# Patient Record
Sex: MALE | Race: WHITE
[De-identification: names, ages, dates, MRNs, and addresses within clinical notes are randomized per-mention and may not be internally consistent; named-entity substitution may affect disease eponyms.]

---

## 2021-04-03 ENCOUNTER — HOSPITAL ENCOUNTER (EMERGENCY)
Dept: HOSPITAL 76 - ED | Age: 31
Discharge: HOME | End: 2021-04-03
Payer: MEDICAID

## 2021-04-03 VITALS — DIASTOLIC BLOOD PRESSURE: 66 MMHG | SYSTOLIC BLOOD PRESSURE: 125 MMHG

## 2021-04-03 DIAGNOSIS — R60.0: Primary | ICD-10-CM

## 2021-04-03 PROCEDURE — 99284 EMERGENCY DEPT VISIT MOD MDM: CPT

## 2021-04-03 NOTE — ULTRASOUND REPORT
PROCEDURE:  Duplex Ext Veins Left

 

INDICATIONS:  LLE swelling

 

TECHNIQUE:  

Real-time imaging, as well as color and pulse Doppler interrogation, were performed of the lower extr
emity deep veins from the inguinal ligament to the popliteal fossa.  

 

COMPARISON:  None.

 

FINDINGS:  The deep veins are normally compressible, and free of intraluminal thrombus.  Color and pu
lse Doppler demonstrate normal phasic intraluminal flow.  There is normal augmentation response to di
stal compression maneuver.  

 

Additionally, near the area of reported pain in the anterior knee, near the underlying osseous struct
ures, there is a 5 mm x 3 mm x 3 mm anechoic structure which likely represents a ganglion cyst or jaswant
nt recess.

 

IMPRESSION:  

No evidence of deep venous thrombosis in the left lower cavity.

 

Additionally, near the area of reported pain in the anterior knee, near the underlying osseous struct
ures, there is a 5 mm x 3 mm x 3 mm anechoic structure which likely represents a ganglion cyst or jaswant
nt recess.

 

Reviewed by: Lyle Prieto on 4/3/2021 4:52 PM MICHELE

Approved by: Lyle Prieto on 4/3/2021 4:52 PM MICHELE

 

 

Station ID:  [id number]

## 2021-04-03 NOTE — ED PHYSICIAN DOCUMENTATION
History of Present Illness





- Stated complaint


Stated Complaint: LT LEG SWOLLEN





- Chief complaint


Chief Complaint: Ext Problem





- History obtained from


History obtained from: Patient





- History of Present Illness


Timing: How many days ago (3)


Pain level max: 0


Pain level now: 0





- Additonal information


Additional information: 





31-year-old male presents to the emergency department with intermittent swelling

to the left calf over the past 3 days.  Seems to be worse when he is kneeling, 

better with elevating his legs or walking.  No redness.  No fevers.  No pain





Review of Systems


Constitutional: denies: Fever, Chills


Cardiac: denies: Chest pain / pressure


Respiratory: denies: Dyspnea, Wheezing





PD PAST MEDICAL HISTORY





- Past Medical History


Past Medical History: No





- Past Surgical History


Past Surgical History: No





- Present Medications


Home Medications: 


                                Ambulatory Orders











 Medication  Instructions  Recorded  Confirmed


 


No Known Home Medications  10/21/15 10/21/15














- Allergies


Allergies/Adverse Reactions: 


                                    Allergies











Allergy/AdvReac Type Severity Reaction Status Date / Time


 


No Known Drug Allergies Allergy   Verified 04/03/21 16:23














- Social History


Does the pt smoke?: No


Smoking Status: Never smoker


Does the pt drink ETOH?: Yes


Does the pt have substance abuse?: No





- Immunizations


Immunizations are current?: Yes





PD ED PE NORMAL





- Vitals


Vital signs reviewed: Yes





- General


General: Alert and oriented X 3, No acute distress





- HEENT


HEENT: Moist mucous membranes





- Neck


Neck: Supple, no meningeal sign





- Cardiac


Cardiac: RRR





- Respiratory


Respiratory: No respiratory distress, Clear bilaterally





- Derm


Derm: Warm and dry





- Extremities


Extremities: No calf tenderness / cord, Other (Normal examination of the knee 

including all ligaments.  No joint effusion.  Minimal edema to the left anterior

tibia and calf.  Neurovascular intact.  No skin changes.  No calf tenderness.)





- Neuro


Neuro: Alert and oriented X 3





Results





- Vitals


Vitals: 


                               Vital Signs - 24 hr











  04/03/21





  16:17


 


Temperature 36.4 C L


 


Heart Rate 66


 


Respiratory 16





Rate 


 


Blood Pressure 125/66


 


O2 Saturation 99








                                     Oxygen











O2 Source                      Room air

















- Rads (name of study)


  ** Duplex ultrasound left lower extremity


Radiology: Prelim report reviewed, EMP read contemporaneously, See rad report 

(No acute findings no DVT)





PD MEDICAL DECISION MAKING





- ED course


Complexity details: considered differential, d/w patient


ED course: 





31-year-old male with mild swelling to the left calf.  Negative ultrasound.  No 

evidence of bony abnormality.  No evidence of knee injury or instability.  No 

joint effusion.  We will have him follow-up with his doctor for further care.  

No emergency medical condition at this time.  Patient counseled regarding signs 

and symptoms for which I believe and urgent re-evaluation would be necessary. 

Patient with good understanding of and agreement to plan and is comfortable 

going home at this time





This document was made in part using voice recognition software. While efforts 

are made to proofread this document, sound alike and grammatical errors may 

occur.





Departure





- Departure


Disposition: 01 Home, Self Care


Clinical Impression: 


 Peripheral edema





Condition: Good


Instructions:  ED Leg Swelling Unilateral


Follow-Up: 


Your,doctor in 1 week [Other]


Comments: 


Follow-up with your doctor this week for further care.  Return if you worsen.  

Your ultrasound does not show any abnormalities today.  Return especially for 

redness, swelling that does not go away or any other new or worsening symptoms.


Discharge Date/Time: 04/03/21 17:47

## 2021-04-07 NOTE — EXTERNAL MEDICAL SUMMARY RPT
Continuity of Care Document

                            Created on:2021



Patient:SHELDON GRAY

Sex:Male

:1990

External Reference #:[id number]





Demographics







                          Phone                     Unavailable

 

                          Preferred Language        Unknown

 

                          Marital Status            Unknown

 

                          [employer] Affiliation     Unknown

 

                          Race                      Unknown

 

                          Ethnic Group              Unknown









Author







                          Organization              [facility]

 

                          [place] [address]

 

                          Phone                     [phone]









Social History







                     date                description         facility

 

                     [id number]

## 2021-04-20 ENCOUNTER — HOSPITAL ENCOUNTER (OUTPATIENT)
Dept: HOSPITAL 76 - COV | Age: 31
Discharge: HOME | End: 2021-04-20
Attending: FAMILY MEDICINE
Payer: COMMERCIAL

## 2021-04-20 DIAGNOSIS — U07.1: Primary | ICD-10-CM

## 2021-06-16 ENCOUNTER — HOSPITAL ENCOUNTER (EMERGENCY)
Dept: HOSPITAL 76 - ED | Age: 31
Discharge: HOME | End: 2021-06-16
Payer: COMMERCIAL

## 2021-06-16 VITALS — DIASTOLIC BLOOD PRESSURE: 74 MMHG | SYSTOLIC BLOOD PRESSURE: 133 MMHG
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
## 2021-06-16 DIAGNOSIS — S39.012A: Primary | ICD-10-CM

## 2021-06-16 PROCEDURE — 99284 EMERGENCY DEPT VISIT MOD MDM: CPT

## 2021-06-16 PROCEDURE — 96372 THER/PROPH/DIAG INJ SC/IM: CPT

## 2021-06-16 PROCEDURE — 99283 EMERGENCY DEPT VISIT LOW MDM: CPT

## 2021-06-16 NOTE — ED PHYSICIAN DOCUMENTATION
PD HPI BACK PAIN





- Stated complaint


Stated Complaint: BACK PX





- Chief complaint


Chief Complaint: Back Pain





- History obtained from


History obtained from: Patient





- History of Present Illness


Timing - onset: Yesterday


Timing - duration: Days (1 1/2)


Timing - details: Gradual onset, Still present (worse with spasms awakening 

today.)


Location: Lower, Right, Left


Quality: Pain, Spasm


Associated symptoms: No: Fever, Weakness, Numbness, Incontinent of urine


Improves with: Position (lying down).  No: Rest, Meds (ibuprofen)


Worsened by: Movement, Lifting.  No: Palpation


Contributing factors: Lifting, Twisting.  No: Trauma


Similar symptoms before: No diagnosis (milder episodes but not chronic)


Recently seen: Not recently seen





Review of Systems


Constitutional: denies: Fever, Chills


Nose: denies: Rhinorrhea / runny nose, Congestion


Throat: denies: Sore throat


Respiratory: denies: Cough


GI: denies: Abdominal Pain


: denies: Incontinent


Skin: denies: Rash


Neurologic: denies: Focal weakness, Numbness





PD PAST MEDICAL HISTORY





- Past Medical History


Past Medical History: No





- Past Surgical History


Past Surgical History: No





- Present Medications


Home Medications: 


                                Ambulatory Orders











 Medication  Instructions  Recorded  Confirmed


 


HYDROcod/ACETAM 5/325 [Norco 5/325] 1 ea PO Q6H PRN #18 tablet 06/16/21 


 


dexAMETHasone [Decadron] 4 mg PO DAILY #5 tablet 06/16/21 


 


tiZANidine [Zanaflex] 4 mg PO Q8H PRN #25 tablet 06/16/21 














- Allergies


Allergies/Adverse Reactions: 


                                    Allergies











Allergy/AdvReac Type Severity Reaction Status Date / Time


 


No Known Drug Allergies Allergy   Verified 06/16/21 12:04














- Social History


Does the pt smoke?: No


Smoking Status: Never smoker


Does the pt drink ETOH?: Yes


Does the pt have substance abuse?: No





- Immunizations


Immunizations are current?: Yes





- POLST


Patient has POLST: No





PD ED PE NORMAL





- Vitals


Vital signs reviewed: Yes





- General


General: Alert and oriented X 3, Well developed/nourished, Other (appears quite 

uncomfortable from back pain. Standing bedside and leaning over, with chest on 

cart. Back is tender in paralumbar muscles left and right. No skin tenderness. 

No rash nor sores. )





- Cardiac


Cardiac: RRR





- Respiratory


Respiratory: Clear bilaterally





- Abdomen


Abdomen: Soft, Non tender





- Rectal


Rectal: Deferred (but through clothing, has sensation to touch in perirectal and

 gluteal areas. )





- Back


Back: No CVA TTP





- Derm


Derm: Normal color, Warm and dry





- Extremities


Extremities: No edema





- Neuro


Neuro: Alert and oriented X 3, No motor deficit, No sensory deficit, Normal 

speech





Results





- Vitals


Vitals: 


                               Vital Signs - 24 hr











  06/16/21





  14:21


 


Temperature 36.7 C


 


Heart Rate 66


 


Respiratory 12





Rate 


 


Blood Pressure 133/74 H


 


O2 Saturation 100








                                     Oxygen











O2 Source                      Room air

















PD MEDICAL DECISION MAKING





- ED course


Complexity details: re-evaluated patient (improved enough with IM meds; he feels

 improved to go out and declines further meds. ), considered differential (no 

red flags to suggest need for labs/imaging. Does seem very uncomfortable so 

offered IM meds here. ), d/w patient





Departure





- Departure


Disposition: 01 Home, Self Care


Clinical Impression: 


Low back strain


Qualifiers:


 Encounter type: initial encounter Qualified Code(s): S39.012A - Strain of 

muscle, fascia and tendon of lower back, initial encounter





Condition: Stable


Record reviewed to determine appropriate education?: Yes


Instructions:  ED Sprain Strain Lumbar


Prescriptions: 


dexAMETHasone [Decadron] 4 mg PO DAILY #5 tablet


HYDROcod/ACETAM 5/325 [Norco 5/325] 1 ea PO Q6H PRN #18 tablet


 PRN Reason: Pain


tiZANidine [Zanaflex] 4 mg PO Q8H PRN #25 tablet


 PRN Reason: Spasms


Comments: 


Heat and gentle stretching for the low back.  Massage or chiropractic are good 

as well.  Avoid heavy lifting and repetitive bending until its improving.





Anti-inflammatories such as ibuprofen or naproxen 3 times a day with food.  Add 

tizanidine muscle relaxant for spasms and stiffness.





To that add Tylenol every 4-6 hours for pain or hydrocodone for worse pain.





I would anticipate improvement over the next several days and resolution over 3 

to 5 days or so.  Recheck if not improving in that timeframe.  You may still 

have some soreness and stiffness for a week or 2 even so be guarded about heavy 

activity and limited as needed.





My narcotic instructions: I am prescribing a short course of narcotic pain 

medication for you.  These are potentially dangerous and addictive medications 

that should be used carefully.


These medications may constipate you.  Take an over-the-counter stool softener 

such as docusate twice daily with plenty of water while taking these 

medications.  If you go 24 hours without a bowel movement, take over-the-counter

 MiraLAX, per package instructions.


Do not drink or drive while taking these medications.


If you received narcotic or sedating medications while in the emergency 

department do not drive for 24 hours.  Store this medication in a safe, secure 

place and out of reach of children.


It is a violation of federal law to give or sell this medication to another 

person or to use in a manner other than prescribed.


The ED will not refill narcotic prescriptions, including prescriptions lost or 

stolen.  You can dispose of unwanted medications at the [facility] office 

or at several pharmacies such as [facility].


Discharge Date/Time: 06/16/21 14:25

## 2023-08-14 ENCOUNTER — HOSPITAL ENCOUNTER (EMERGENCY)
Dept: HOSPITAL 76 - ED | Age: 33
Discharge: HOME | End: 2023-08-14
Payer: MEDICAID

## 2023-08-14 VITALS — OXYGEN SATURATION: 100 % | SYSTOLIC BLOOD PRESSURE: 120 MMHG | DIASTOLIC BLOOD PRESSURE: 65 MMHG
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
## 2023-08-14 DIAGNOSIS — S93.401A: ICD-10-CM
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
## 2023-08-14 DIAGNOSIS — S90.01XA: Primary | ICD-10-CM

## 2023-08-14 PROCEDURE — 99283 EMERGENCY DEPT VISIT LOW MDM: CPT

## 2023-08-14 NOTE — XRAY REPORT
PROCEDURE:  Ankle 3 View RT

 

INDICATIONS:  Trauma

 

TECHNIQUE:  3 views of the ankle were acquired.  

 

COMPARISON:  Right tibia and fibula radiographs same day

 

FINDINGS:  

 

Bones:  No fractures or dislocations.  Ankle mortise is normally aligned.  No suspicious bony lesions
.  

 

Soft tissues:  No tibiotalar joint effusion.    

 

IMPRESSION:  

No acute bony abnormality. If there remains a high clinical concern for fracture, consider cross-sect
ional imaging now. If pain persists, consider repeat x-ray in 10-14 days or cross-sectional imaging.

 

 

 

Reviewed by: Moises Troy MD on 8/14/2023 12:49 PM PDT

Approved by: Moises Troy MD on 8/14/2023 12:49 PM PDT

 

 

Station ID:  [id number]
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
## 2023-08-14 NOTE — XRAY REPORT
PROCEDURE:  Tib/Fib RT

 

INDICATIONS:  Trauma

 

TECHNIQUE:  2 views of the tibia and fibula were acquired.  

 

COMPARISON:  Right ankle radiographs same day.

 

FINDINGS:  

 

Bones:  No fractures or dislocations.  No suspicious bony lesions.  

 

Soft tissues:  No suspicious soft tissue calcifications.  

 

 

IMPRESSION:  

No acute bony abnormality. If pain persists with conservative management, consider repeat radiographs
 in 10-14 days or cross-sectional imaging.

 

 

Reviewed by: Moises Troy MD on 8/14/2023 12:56 PM PDT

Approved by: Moises Troy MD on 8/14/2023 12:56 PM PDT

 

 

Station ID:  [id number]

## 2023-09-09 ENCOUNTER — HOSPITAL ENCOUNTER (OUTPATIENT)
Dept: HOSPITAL 76 - EMS | Age: 33
End: 2023-09-09
Payer: COMMERCIAL

## 2023-09-09 DIAGNOSIS — Z04.1: Primary | ICD-10-CM
